# Patient Record
Sex: FEMALE
[De-identification: names, ages, dates, MRNs, and addresses within clinical notes are randomized per-mention and may not be internally consistent; named-entity substitution may affect disease eponyms.]

---

## 2018-10-02 ENCOUNTER — HOSPITAL ENCOUNTER (EMERGENCY)
Dept: HOSPITAL 62 - ER | Age: 23
LOS: 1 days | Discharge: HOME | End: 2018-10-03
Payer: OTHER GOVERNMENT

## 2018-10-02 VITALS — SYSTOLIC BLOOD PRESSURE: 104 MMHG | DIASTOLIC BLOOD PRESSURE: 60 MMHG

## 2018-10-02 DIAGNOSIS — R05: ICD-10-CM

## 2018-10-02 DIAGNOSIS — R10.11: ICD-10-CM

## 2018-10-02 DIAGNOSIS — Z88.3: ICD-10-CM

## 2018-10-02 DIAGNOSIS — R50.9: Primary | ICD-10-CM

## 2018-10-02 DIAGNOSIS — Z91.040: ICD-10-CM

## 2018-10-02 DIAGNOSIS — M79.10: ICD-10-CM

## 2018-10-02 LAB
A TYPE INFLUENZA AG: NEGATIVE
ADD MANUAL DIFF: NO
ALBUMIN SERPL-MCNC: 4.8 G/DL (ref 3.5–5)
ALP SERPL-CCNC: 59 U/L (ref 38–126)
ALT SERPL-CCNC: 26 U/L (ref 9–52)
ANION GAP SERPL CALC-SCNC: 14 MMOL/L (ref 5–19)
APPEARANCE UR: CLEAR
APTT PPP: YELLOW S
AST SERPL-CCNC: 19 U/L (ref 14–36)
B INFLUENZA AG: NEGATIVE
BASOPHILS # BLD AUTO: 0 10^3/UL (ref 0–0.2)
BASOPHILS NFR BLD AUTO: 0.2 % (ref 0–2)
BILIRUB DIRECT SERPL-MCNC: 0.7 MG/DL (ref 0–0.4)
BILIRUB SERPL-MCNC: 3.5 MG/DL (ref 0.2–1.3)
BILIRUB UR QL STRIP: NEGATIVE
BUN SERPL-MCNC: 7 MG/DL (ref 7–20)
CALCIUM: 10.2 MG/DL (ref 8.4–10.2)
CHLORIDE SERPL-SCNC: 98 MMOL/L (ref 98–107)
CO2 SERPL-SCNC: 25 MMOL/L (ref 22–30)
EOSINOPHIL # BLD AUTO: 0 10^3/UL (ref 0–0.6)
EOSINOPHIL NFR BLD AUTO: 0 % (ref 0–6)
ERYTHROCYTE [DISTWIDTH] IN BLOOD BY AUTOMATED COUNT: 12.2 % (ref 11.5–14)
GLUCOSE SERPL-MCNC: 101 MG/DL (ref 75–110)
GLUCOSE UR STRIP-MCNC: NEGATIVE MG/DL
HCT VFR BLD CALC: 42.9 % (ref 36–47)
HGB BLD-MCNC: 15.1 G/DL (ref 12–15.5)
KETONES UR STRIP-MCNC: 20 MG/DL
LIPASE SERPL-CCNC: 25.9 U/L (ref 23–300)
LYMPHOCYTES # BLD AUTO: 1.3 10^3/UL (ref 0.5–4.7)
LYMPHOCYTES NFR BLD AUTO: 7.8 % (ref 13–45)
MCH RBC QN AUTO: 31.4 PG (ref 27–33.4)
MCHC RBC AUTO-ENTMCNC: 35.2 G/DL (ref 32–36)
MCV RBC AUTO: 89 FL (ref 80–97)
MONOCYTES # BLD AUTO: 1 10^3/UL (ref 0.1–1.4)
MONOCYTES NFR BLD AUTO: 5.8 % (ref 3–13)
NEUTROPHILS # BLD AUTO: 14.8 10^3/UL (ref 1.7–8.2)
NEUTS SEG NFR BLD AUTO: 86.2 % (ref 42–78)
NITRITE UR QL STRIP: NEGATIVE
PH UR STRIP: 8 [PH] (ref 5–9)
PLATELET # BLD: 233 10^3/UL (ref 150–450)
POTASSIUM SERPL-SCNC: 4.3 MMOL/L (ref 3.6–5)
PROT SERPL-MCNC: 8.1 G/DL (ref 6.3–8.2)
PROT UR STRIP-MCNC: NEGATIVE MG/DL
RBC # BLD AUTO: 4.82 10^6/UL (ref 3.72–5.28)
SODIUM SERPL-SCNC: 137.2 MMOL/L (ref 137–145)
SP GR UR STRIP: 1
TOTAL CELLS COUNTED % (AUTO): 100 %
UROBILINOGEN UR-MCNC: NEGATIVE MG/DL (ref ?–2)
WBC # BLD AUTO: 17.2 10^3/UL (ref 4–10.5)

## 2018-10-02 PROCEDURE — 80053 COMPREHEN METABOLIC PANEL: CPT

## 2018-10-02 PROCEDURE — 83690 ASSAY OF LIPASE: CPT

## 2018-10-02 PROCEDURE — 76705 ECHO EXAM OF ABDOMEN: CPT

## 2018-10-02 PROCEDURE — 36415 COLL VENOUS BLD VENIPUNCTURE: CPT

## 2018-10-02 PROCEDURE — 85025 COMPLETE CBC W/AUTO DIFF WBC: CPT

## 2018-10-02 PROCEDURE — 81001 URINALYSIS AUTO W/SCOPE: CPT

## 2018-10-02 PROCEDURE — 84702 CHORIONIC GONADOTROPIN TEST: CPT

## 2018-10-02 PROCEDURE — 99284 EMERGENCY DEPT VISIT MOD MDM: CPT

## 2018-10-02 PROCEDURE — 87804 INFLUENZA ASSAY W/OPTIC: CPT

## 2018-10-02 NOTE — ER DOCUMENT REPORT
ED General





- General


Chief Complaint: Fever


Stated Complaint: FEVER


Time Seen by Provider: 10/02/18 19:43


Mode of Arrival: Ambulatory


Information source: Patient


Notes: 





Patient is a 23-year-old female who presents today with a fever that started 

last night at 1 a.m.  She states that she has body aches all over including her 

back, ribs, and legs.  She specifically has right upper quadrant abdominal 

pain.  She states that the Tylenol has helped with the pain.  But her right 

upper quadrant still hurts upon palpation.


TRAVEL OUTSIDE OF THE [place] IN LAST 30 DAYS: No





- Related Data


Allergies/Adverse Reactions: 


 





latex Allergy (Verified 10/02/18 19:53)


 


vancomycin Allergy (Verified 10/02/18 18:48)


 











Past Medical History





- General


Information source: Patient





- Social History


Smoking Status: Never Smoker


Chew tobacco use (# tins/day): No


Frequency of alcohol use: None


Drug Abuse: None


Lives with: Alone


Family History: Other - Mother has history of Gallbladder problems


Patient has suicidal ideation: No


Patient has homicidal ideation: No


Renal/ Medical History: Denies: Hx Peritoneal Dialysis





Review of Systems





- Review of Systems


Constitutional: See HPI


Gastrointestinal: See HPI





Physical Exam





- Vital signs


Vitals: 


 











Temp Pulse Resp BP Pulse Ox


 


 101.4 F H  133 H  24 H  104/60   100 


 


 10/02/18 19:15  10/02/18 19:15  10/02/18 19:15  10/02/18 19:15  10/02/18 19:15














- General


General appearance: Alert





- HEENT


Head: Normocephalic


Eyes: Normal


Conjunctiva: Normal


Ears: Normal


External canal: Normal


Tympanic membrane: Normal


Sinus: Normal


Nasal: Clear rhinorrhea


Mucous membranes: Moist


Pharynx: Erythema


Neck: Anterior cervical chain





- Respiratory


Respiratory status: No respiratory distress


Chest status: Nontender


Breath sounds: Normal


Chest palpation: Normal





- Cardiovascular


Pulses: Normal: Radial, Dorsalis pedis





Course





- Re-evaluation


Re-evalutation: 


10/02/18 21:45 Patient at this time looks overall well in appearance despite 

nasal congestion noted when speaking.  Patient states that her body aches all 

over, but is feeling better with Tylenol.  She states that Tylenol is the only 

thing that helps her feel better.  She also states that she has been increasing 

her fluid intake.  She states that she has been coughing up green mucus 

primarily in the morning, but continues to cough throughout the day.  She was 

seen by [facility] yesterday, where she found out she was 5 weeks pregnant.

  She denies any bleeding, cramping, or vaginal discharge.  Do not suspect any 

pelvic pathology at this time.  Patient denies nuchal rigidity and I do not 

suspect that patient has meningitis. 


10/02/18 22:30 nursing staff notified me of increased temperature of 102. 

Discussion of a chest x-ray to rule out pneumonia and exposure to radiation 

while being pregnant was discussed in detail.  Patient refused to have chest x-

ray done at this time.  Patient has a leukocytosis of 17,000.


10/02/18 23:45 Labs and right upper quadrant ultrasound results were discussed 

with the patient.   I do not suspect any abdominal pathology at this time.


10/03/18 00:03 Patient was reevaluated with a heart rate of 113 and a 

temperature of 102.7.  P.o. fluid encouraged to help with patient's tachycardia 

and fever.  Will reevaluate vital signs.  Pneumonia is suspected at this time, 

but unfortunately we can not diagnose this due to patient refusing chest x-ray.


10/03/18 00:43


Patient's vital signs have improved with a heart rate of 105 and a temperature 

of 99.7.  Patient verbally instructed on very close follow-up visit with 

primary care doctor.








- Vital Signs


Vital signs: 


 











Temp Pulse Resp BP Pulse Ox


 


 101.4 F H  133 H  24 H  104/60   100 


 


 10/02/18 19:15  10/02/18 19:15  10/02/18 19:15  10/02/18 19:15  10/02/18 19:15














- Laboratory


Result Diagrams: 


 10/02/18 22:02





 10/02/18 22:02


Laboratory results interpreted by me: 


 











  10/02/18 10/02/18 10/02/18





  20:00 20:00 22:02


 


WBC    17.2 H


 


Seg Neutrophils %    86.2 H


 


Lymphocytes %    7.8 L


 


Absolute Neutrophils    14.8 H


 


Total Bilirubin   


 


Direct Bilirubin   


 


Beta HCG, Quant  625.44 H  


 


Urine Ketones   20 H 


 


Urine Blood   SMALL H 














  10/02/18





  22:02


 


WBC 


 


Seg Neutrophils % 


 


Lymphocytes % 


 


Absolute Neutrophils 


 


Total Bilirubin  3.5 H


 


Direct Bilirubin  0.7 H


 


Beta HCG, Quant 


 


Urine Ketones 


 


Urine Blood 














Discharge





- Discharge


Clinical Impression: 


 Productive cough, Generalized body aches





Fever


Qualifiers:


 Fever type: unspecified Qualified Code(s): R50.9 - Fever, unspecified





Condition: Stable


Disposition: HOME, SELF-CARE


Additional Instructions: 


Please follow-up with your primary care provider.  Please take Tylenol 975 mg 

every 6 hours as needed for fever.  Follow-up with your OB/GYN for prenatal 

care.  You have been prescribed azithromycin, and antibiotic.  Please finish 

all your antibiotics as prescribed.  If you feel you are not getting better, 

have shortness of breath, chest pain, or feeling worse than you have tonight, 

please return to the emergency department.


Prescriptions: 


Azithromycin 250 mg PO DAILY #4 tablet


Referrals: 


MIRNA BA MD [Primary Care Provider] - Follow up as needed

## 2018-10-02 NOTE — ER DOCUMENT REPORT
ED Medical Screen (RME)





- General


Chief Complaint: Fever


Stated Complaint: FEVER


Time Seen by Provider: 10/02/18 19:43


Mode of Arrival: Ambulatory


Information source: Patient


Notes: 





23-year-old female presents with complaint of right flank pain that started 1 

day prior to arrival and fever that started this morning.  Patient reports that 

she had recent blood work that revealed that she was pregnant.  Her last 

menstrual period was September 2, 2018.








I have greeted and performed a rapid initial assessment of this patient.  A 

comprehensive ED assessment and evaluation of the patient, analysis of test 

results and completion of medical decision making process we will be contacted 

by additional ED providers.








PHYSICAL EXAMINATION:





Vital signs reviewed-febrile, tachycardic





GENERAL: Well-appearing, well-nourished and in no acute distress.





LUNGS: No respiratory distress





Musculoskeletal: Normal range of motion





NEUROLOGICAL:  Normal speech, normal gait. 





PSYCH: Normal mood, normal affect.





SKIN: Warm, Dry, normal turgor, no rashes or lesions noted.


TRAVEL OUTSIDE OF THE [place] IN LAST 30 DAYS: No





- HPI


Onset: Yesterday


Onset/Duration: Gradual


Quality of pain: Achy


Associated Symptoms: Fever.  denies: Vaginal bleeding


Exacerbated by: Denies


Relieved by: Denies


Similar symptoms previously: No


Recently seen / treated by doctor: No





- Related Data


Smoking: Non-smoker


Frequency of alcohol use: None


Drug Abuse: None


Allergies/Adverse Reactions: 


 





latex Allergy (Verified 10/02/18 19:53)


 


vancomycin Allergy (Verified 10/02/18 18:48)


 











Past Medical History





- Social History


Chew tobacco use (# tins/day): No


Frequency of alcohol use: None


Drug Abuse: None


Renal/ Medical History: Denies: Hx Peritoneal Dialysis





Physical Exam





- Vital signs


Vitals: 





 











Temp Pulse Resp BP Pulse Ox


 


 101.4 F H  133 H  24 H  104/60   100 


 


 10/02/18 19:15  10/02/18 19:15  10/02/18 19:15  10/02/18 19:15  10/02/18 19:15














Course





- Vital Signs


Vital signs: 





 











Temp Pulse Resp BP Pulse Ox


 


 101.4 F H  133 H  24 H  104/60   100 


 


 10/02/18 19:15  10/02/18 19:15  10/02/18 19:15  10/02/18 19:15  10/02/18 19:15














Doctor's Discharge





- Discharge


Referrals: 


MIRNA BA MD [Primary Care Provider] - Follow up as needed

## 2018-10-02 NOTE — RADIOLOGY REPORT (SQ)
EXAM DESCRIPTION: 



US ABDOMEN LIMITED



COMPLETED DATE/TME:  10/02/2018 21:56



CLINICAL HISTORY: 



23 years, Female, RUQ pain



Findings: The liver is unremarkable with no focal lesions. No

significant biliary dilatation with CBD measuring 2 mm.

Gallbladder is unremarkable with no evidence for calculus, wall

thickening or pericholecystic fluid. No sonographic Tony sign.

The aorta and IVC are within normal limits. The pancreas is

unremarkable. Portal vein demonstrates hepatopedal flow. Right

kidney does not demonstrate hydronephrosis. No right upper

quadrant ascites.



IMPRESSION:



No evidence for cholelithiasis or cholecystitis.